# Patient Record
Sex: MALE | Race: WHITE | HISPANIC OR LATINO
[De-identification: names, ages, dates, MRNs, and addresses within clinical notes are randomized per-mention and may not be internally consistent; named-entity substitution may affect disease eponyms.]

---

## 2017-07-08 ENCOUNTER — HOSPITAL (OUTPATIENT)
Dept: OTHER | Age: 37
End: 2017-07-08
Attending: EMERGENCY MEDICINE

## 2019-01-13 ENCOUNTER — HOSPITAL ENCOUNTER (EMERGENCY)
Facility: HOSPITAL | Age: 39
Discharge: HOME | End: 2019-01-13
Attending: EMERGENCY MEDICINE

## 2019-01-13 VITALS
OXYGEN SATURATION: 96 % | BODY MASS INDEX: 28.35 KG/M2 | WEIGHT: 160 LBS | HEART RATE: 85 BPM | RESPIRATION RATE: 18 BRPM | SYSTOLIC BLOOD PRESSURE: 153 MMHG | DIASTOLIC BLOOD PRESSURE: 83 MMHG | HEIGHT: 63 IN | TEMPERATURE: 98 F

## 2019-01-13 DIAGNOSIS — J02.0 STREP PHARYNGITIS: Primary | ICD-10-CM

## 2019-01-13 LAB — S PYO AG THROAT QL: POSITIVE

## 2019-01-13 PROCEDURE — 99283 EMERGENCY DEPT VISIT LOW MDM: CPT

## 2019-01-13 PROCEDURE — 87430 STREP A AG IA: CPT

## 2019-01-13 RX ORDER — PENICILLIN V POTASSIUM 500 MG/1
500 TABLET ORAL 2 TIMES DAILY
Qty: 20 TABLET | Refills: 0 | Status: SHIPPED | OUTPATIENT
Start: 2019-01-13 | End: 2019-01-23

## 2019-01-13 NOTE — ED PROVIDER NOTES
Patient Seen in: [facility] AND [facility] Emergency Department    History   Patient presents with:  Sore Throat    Stated Complaint: sore throat    HPI    Patient is a 39-year-old male who presents with sore throat times 5 days.   Positive pain with swallowing b components within normal limits       MDM   Patient notified of strep results. Will start on antibiotics. Tolerating p.o. and appears nontoxic.             Disposition and Plan     Clinical Impression:  Strep pharyngitis  (primary encounter diagnosis)

## 2024-10-04 ENCOUNTER — OFFICE VISIT (OUTPATIENT)
Dept: FAMILY MEDICINE CLINIC | Facility: CLINIC | Age: 44
End: 2024-10-04

## 2024-10-04 ENCOUNTER — NURSE TRIAGE (OUTPATIENT)
Dept: FAMILY MEDICINE CLINIC | Facility: CLINIC | Age: 44
End: 2024-10-04

## 2024-10-04 VITALS
BODY MASS INDEX: 27.32 KG/M2 | OXYGEN SATURATION: 97 % | HEART RATE: 70 BPM | HEIGHT: 65 IN | WEIGHT: 164 LBS | DIASTOLIC BLOOD PRESSURE: 73 MMHG | SYSTOLIC BLOOD PRESSURE: 127 MMHG | TEMPERATURE: 98 F

## 2024-10-04 DIAGNOSIS — H66.91 RIGHT OTITIS MEDIA, UNSPECIFIED OTITIS MEDIA TYPE: ICD-10-CM

## 2024-10-04 DIAGNOSIS — J18.9 ATYPICAL PNEUMONIA: Primary | ICD-10-CM

## 2024-10-04 PROCEDURE — 99203 OFFICE O/P NEW LOW 30 MIN: CPT | Performed by: STUDENT IN AN ORGANIZED HEALTH CARE EDUCATION/TRAINING PROGRAM

## 2024-10-04 RX ORDER — FLUTICASONE PROPIONATE 50 MCG
1 SPRAY, SUSPENSION (ML) NASAL DAILY
Qty: 1 EACH | Refills: 3 | Status: SHIPPED | OUTPATIENT
Start: 2024-10-04

## 2024-10-04 RX ORDER — AZITHROMYCIN 250 MG/1
TABLET, FILM COATED ORAL
Qty: 6 TABLET | Refills: 0 | Status: SHIPPED | OUTPATIENT
Start: 2024-10-04 | End: 2024-10-08

## 2024-10-04 NOTE — TELEPHONE ENCOUNTER
Action Requested: Summary for Provider     []  Critical Lab, Recommendations Needed  [] Need Additional Advice  []   FYI    []   Need Orders  [] Need Medications Sent to Pharmacy  []  Other     SUMMARY: Office visit  Future Appointments   Date Time Provider Department Center   10/4/2024 10:45 AM Nilda Billy MD [facility]     Reason for call: Weakness  Onset: Data Unavailable    Patient states he has weakness and nausea that has been going on for one week. He denies fever, vomiting, diarrhea.     Reason for Disposition   MODERATE weakness (i.e., interferes with work, school, normal activities) and persists > 3 days    Protocols used: Weakness (Generalized) and Fatigue-A-OH

## 2024-10-04 NOTE — PROGRESS NOTES
HPI:    Patient ID: Leopoldo Solorio is a 44 year old male.    HPI  Pt presenting with fatigue. Wife present for visit.    Onset of Right ear discomfort with water sensation last week  Associated throat irritation, improved  Complains of increased fatigue, intermittent cough  Endorses nausea without vomiting  Dizziness with movement, upon waking  Hydrating as able  No chest pain, chest tightness, diarrhea/constipation  Reports change in stool odor  No recent travel  No new foods, exposure, medications  No known sick contacts      Review of Systems   A comprehensive 10 point review of systems was completed.  Pertinent positives and negatives noted in the the HPI.       Current Outpatient Medications   Medication Sig Dispense Refill    azithromycin 250 MG Oral Tab Take 2 tablets (500 mg total) by mouth daily for 1 day, THEN 1 tablet (250 mg total) daily for 4 days. 6 tablet 0    fluticasone propionate 50 MCG/ACT Nasal Suspension 1 spray by Nasal route daily. One spray per each nostril daily. 1 each 3     Allergies:  Allergies   Allergen Reactions    Ibuprofen ANAPHYLAXIS    Shellfish-Derived Products ANAPHYLAXIS      Vitals:    10/04/24 1037   BP: 127/73   Pulse: 70   Temp: 97.8 °F (36.6 °C)   TempSrc: Oral   SpO2: 97%   Weight: 164 lb (74.4 kg)   Height: 5' 5\" (1.651 m)       Body mass index is 27.29 kg/m².   PHYSICAL EXAM:   Physical Exam  Vitals reviewed.   Constitutional:       General: He is not in acute distress.  HENT:      Head: Normocephalic.      Right Ear: A middle ear effusion is present. Tympanic membrane is erythematous and bulging.      Left Ear: A middle ear effusion is present.   Eyes:      Conjunctiva/sclera: Conjunctivae normal.   Cardiovascular:      Rate and Rhythm: Normal rate and regular rhythm.      Pulses: Normal pulses.      Heart sounds: Normal heart sounds.   Pulmonary:      Effort: Pulmonary effort is normal.      Breath sounds: Examination of the right-middle field reveals decreased  breath sounds. Decreased breath sounds present.      Comments: +egophany  Abdominal:      General: Bowel sounds are normal.      Palpations: Abdomen is soft.   Musculoskeletal:      Cervical back: Normal range of motion. No tenderness.      Right lower leg: No edema.      Left lower leg: No edema.   Lymphadenopathy:      Cervical: No cervical adenopathy.   Neurological:      General: No focal deficit present.      Mental Status: He is alert and oriented to person, place, and time. Mental status is at baseline.   Psychiatric:         Mood and Affect: Mood normal.         Behavior: Behavior normal.             ASSESSMENT/PLAN:   1. Atypical pneumonia  - continue supportive care including nasal saline/suction, humidifier use  - increase hydration and rest as tolerated  - will start azithromycin  - discussed red flags for urgent evaluation  - to call with any questions/concerns  - azithromycin 250 MG Oral Tab; Take 2 tablets (500 mg total) by mouth daily for 1 day, THEN 1 tablet (250 mg total) daily for 4 days.  Dispense: 6 tablet; Refill: 0    2. Right otitis media, unspecified otitis media type  - azithromycin as above.  - demonstrated how to administer Flonase medication  - avoid triggers as able  - increase fluid hydration and rest as tolerated  - to call with any questions or concerns  - azithromycin 250 MG Oral Tab; Take 2 tablets (500 mg total) by mouth daily for 1 day, THEN 1 tablet (250 mg total) daily for 4 days.  Dispense: 6 tablet; Refill: 0  - fluticasone propionate 50 MCG/ACT Nasal Suspension; 1 spray by Nasal route daily. One spray per each nostril daily.  Dispense: 1 each; Refill: 3    Pt verbalized understanding and agrees with plan.    No orders of the defined types were placed in this encounter.      Meds This Visit:  Requested Prescriptions     Signed Prescriptions Disp Refills    azithromycin 250 MG Oral Tab 6 tablet 0     Sig: Take 2 tablets (500 mg total) by mouth daily for 1 day, THEN 1 tablet  (250 mg total) daily for 4 days.    fluticasone propionate 50 MCG/ACT Nasal Suspension 1 each 3     Si spray by Nasal route daily. One spray per each nostril daily.       Imaging & Referrals:  None         ID#[id number]

## 2025-03-31 ENCOUNTER — NURSE TRIAGE (OUTPATIENT)
Dept: FAMILY MEDICINE CLINIC | Facility: CLINIC | Age: 45
End: 2025-03-31

## 2025-03-31 NOTE — TELEPHONE ENCOUNTER
With Mongolian interpretor ID #[id number] Patient sposhazia Meeks stated that today patient woke up today with right ear pain 6/10 that comes and goes and also having right jaw  pain. There is no redness of swelling in theses areas. Per spouse patient also feels a burning sensation all over his face and he has chills and body aches. Per wife patient does not have chest pain,shortness of breath,blurry vision, fever and no weakness of extremities. Inform wife patient needs to be seen today. Wife was advised to take patient to the immediate care. Per spouse the closest one is [place]. I provide her with the address to this location and she will take him now for a evaluation.   Reason for Disposition   Patient sounds very sick or weak to the triager    Protocols used: Earache-A-OH

## 2025-04-01 ENCOUNTER — HOSPITAL ENCOUNTER (EMERGENCY)
Facility: HOSPITAL | Age: 45
Discharge: HOME | End: 2025-04-01
Attending: EMERGENCY MEDICINE
Payer: COMMERCIAL

## 2025-04-01 VITALS
OXYGEN SATURATION: 96 % | SYSTOLIC BLOOD PRESSURE: 138 MMHG | HEIGHT: 65 IN | WEIGHT: 160 LBS | RESPIRATION RATE: 17 BRPM | TEMPERATURE: 99 F | HEART RATE: 71 BPM | DIASTOLIC BLOOD PRESSURE: 95 MMHG | BODY MASS INDEX: 26.66 KG/M2

## 2025-04-01 DIAGNOSIS — J32.9 VIRAL SINUSITIS: Primary | ICD-10-CM

## 2025-04-01 DIAGNOSIS — B97.89 VIRAL SINUSITIS: Primary | ICD-10-CM

## 2025-04-01 DIAGNOSIS — R09.81 NASAL CONGESTION: ICD-10-CM

## 2025-04-01 DIAGNOSIS — R03.0 ELEVATED BLOOD PRESSURE READING: ICD-10-CM

## 2025-04-01 LAB
FLUAV + FLUBV RNA SPEC NAA+PROBE: NEGATIVE
FLUAV + FLUBV RNA SPEC NAA+PROBE: NEGATIVE
RSV RNA SPEC NAA+PROBE: NEGATIVE
SARS-COV-2 RNA RESP QL NAA+PROBE: NOT DETECTED

## 2025-04-01 PROCEDURE — 0241U SARS-COV-2/FLU A AND B/RSV BY PCR (GENEXPERT): CPT

## 2025-04-01 PROCEDURE — 99283 EMERGENCY DEPT VISIT LOW MDM: CPT

## 2025-04-01 PROCEDURE — 0241U SARS-COV-2/FLU A AND B/RSV BY PCR (GENEXPERT): CPT | Performed by: EMERGENCY MEDICINE

## 2025-04-01 RX ORDER — ACETAMINOPHEN 500 MG
1000 TABLET ORAL ONCE
Status: COMPLETED | OUTPATIENT
Start: 2025-04-01 | End: 2025-04-01

## 2025-04-01 RX ORDER — FLUTICASONE PROPIONATE 50 MCG
2 SPRAY, SUSPENSION (ML) NASAL DAILY PRN
Qty: 9.9 ML | Refills: 0 | Status: SHIPPED | OUTPATIENT
Start: 2025-04-01 | End: 2025-05-01

## 2025-04-01 RX ORDER — OXYMETAZOLINE HYDROCHLORIDE 0.05 G/100ML
1 SPRAY NASAL 2 TIMES DAILY
Qty: 15 ML | Refills: 0 | Status: SHIPPED | OUTPATIENT
Start: 2025-04-01 | End: 2025-04-04

## 2025-04-01 RX ORDER — CETIRIZINE HYDROCHLORIDE 10 MG/1
10 TABLET ORAL DAILY PRN
Qty: 30 TABLET | Refills: 0 | Status: SHIPPED | OUTPATIENT
Start: 2025-04-01 | End: 2025-05-01

## 2025-04-01 RX ORDER — ACETAMINOPHEN 325 MG/1
650 TABLET ORAL EVERY 6 HOURS PRN
Qty: 30 TABLET | Refills: 0 | Status: SHIPPED | OUTPATIENT
Start: 2025-04-01

## 2025-04-01 NOTE — DISCHARGE INSTRUCTIONS
Thank you for seeking care at [facility] Emergency Department.  You have been seen and evaluated for viral symptoms. Your swab today was negative however your family member tested positive for influenza and it is likely you are having similar symptoms from this.    We discussed the results of your workup   Please read the instructions provided   If given prescriptions, take as instructed    Please treat fevers with Tylenol or Motrin and remember to keep yourself hydrated as this is the most important thing to help you feel better.      Remember, your care process does not end after your visit today. Please follow-up with your doctor within 1-2 days for a follow-up check to ensure you are  improving, to see if you need any further evaluation/testing, or to evaluate for any alternate diagnoses.     Your blood pressure was noted to be elevated in the ER today. Please follow up with your primary doctor within the next 2-3 months for a reevaluation. Uncontrolled high blood pressure can lead to strokes, heart attacks, and kidney failure.     Please return to the emergency department if you develop any new or worsening symptoms such as dehydration, persistent fevers, difficulty with breathing,  or if you develop any other new or concerning symptoms as these could be signs of more serious medical illness.    We hope you feel better.    Geeta por buscar atención en el Departamento de Emergencias de [facility].  Gregorio sido examinado y evaluado para detectar síntomas virales. Soni hisopado de hoy fue negativo, sin embargo, soni familiar karina positivo para la influenza y es probable que tenga síntomas similares a esto.  Discutimos los resultados de soni evaluación  Por favor, daniel las instrucciones proporcionadas  Si se le dan recetas, tómelas según las instrucciones    Trate la fiebre con Tylenol o Motrin y recuerde mantenerse hidratado, ya que esto es lo más importante para ayudarlo a sentirse mejor.    Recuerde que soni  proceso de atención no termina después de soni visita de fco. Realice un seguimiento con soni médico dentro de 1-2 días para un control de seguimiento para asegurarse de que está mejorando, para dee si necesita alguna evaluación / prueba adicional o para evaluar cualquier diagnóstico alternativo.    Se observó que soni presión arterial estaba elevada en la donavan de emergencias hoy. Consulte a soni médico de cabecera dentro de los próximos 2-3 meses para abdullahi reevaluación. La presión arterial fe no controlada puede provocar accidentes cerebrovasculares, ataques cardíacos e insuficiencia renal.    Regrese al departamento de emergencias si desarrolla algún síntoma nuevo o que empeora, gamal deshidratación, fiebre persistente, dificultad para respirar o si desarrolla cualquier otro síntoma nuevo o preocupante, ya que podrían ser signos de abdullahi enfermedad médica más grave. Esperamos que te sientas mejor.

## 2025-04-01 NOTE — ED PROVIDER NOTES
Patient Seen in: [facility] Emergency Department    History     Chief Complaint   Patient presents with    Cough/URI     Stated Complaint: Whole Body Ache, Fatigue    HPI    Patient is an otherwise healthy 44M here with complaints of maxillary and frontal sinus pressure, nasal congestion, body aches, for 2-3 days. Reports associated ear pressure on both sids. No fever but has had chills. Son sick with similar sx at home. No CP or SOB. No vision changes. No abd pain, nausea or vomiting. No cough. Has had a couple of episodes of nonbloody diarrhea. No travel.  No rashes. No recent fall or head trauma.       History reviewed. No pertinent past medical history.    History reviewed. No pertinent surgical history.         History reviewed. No pertinent family history.    Social History     Socioeconomic History    Marital status:    Tobacco Use    Smoking status: Every Day     Current packs/day: 0.50     Average packs/day: 0.5 packs/day for 25.0 years (12.5 ttl pk-yrs)     Types: Cigarettes    Smokeless tobacco: Never   Vaping Use    Vaping status: Never Used   Substance and Sexual Activity    Alcohol use: Yes     Alcohol/week: 7.0 standard drinks of alcohol     Types: 7 Standard drinks or equivalent per week    Drug use: Not Currently   Social History Narrative    ** Merged History Encounter **            Review of Systems    Positive for stated complaint: Whole Body Ache, Fatigue  Other systems are as noted in HPI.  Constitutional and vital signs reviewed.      All other systems reviewed and negative except as noted above.    PSFH elements reviewed from today and agreed except as otherwise stated in HPI.    Physical Exam     ED Triage Vitals   BP 04/01/25 0929 (!) 150/92   Pulse 04/01/25 0929 81   Resp 04/01/25 0929 18   Temp 04/01/25 0929 98.9 °F (37.2 °C)   Temp src 04/01/25 0929 Temporal   SpO2 04/01/25 0929 96 %   O2 Device 04/01/25 1046 None (Room air)       Current:BP (!) 138/95   Pulse 71   Temp  98.9 °F (37.2 °C) (Temporal)   Resp 17   Ht 165.1 cm (5' 5\")   Wt 72.6 kg   SpO2 96%   BMI 26.63 kg/m²     Physical Exam  Vitals and nursing note reviewed.   Constitutional:       General: He is not in acute distress.     Appearance: He is not ill-appearing.   HENT:      Head: Normocephalic and atraumatic.      Right Ear: Tympanic membrane, ear canal and external ear normal.      Left Ear: Tympanic membrane, ear canal and external ear normal.      Nose: Congestion present. No rhinorrhea.      Comments: Mild ttp to maxillary sinuses      Mouth/Throat:      Mouth: Mucous membranes are moist.      Pharynx: Oropharynx is clear. No oropharyngeal exudate or posterior oropharyngeal erythema.   Eyes:      Extraocular Movements: Extraocular movements intact.      Conjunctiva/sclera: Conjunctivae normal.      Pupils: Pupils are equal, round, and reactive to light.   Cardiovascular:      Rate and Rhythm: Normal rate and regular rhythm.      Heart sounds: No murmur heard.  Pulmonary:      Effort: Pulmonary effort is normal. No respiratory distress.      Breath sounds: No wheezing or rales.   Abdominal:      General: There is no distension.      Palpations: Abdomen is soft.      Tenderness: There is no abdominal tenderness. There is no guarding or rebound.   Musculoskeletal:         General: No deformity.      Cervical back: Neck supple. No rigidity.   Lymphadenopathy:      Cervical: No cervical adenopathy.   Skin:     General: Skin is warm and dry.      Capillary Refill: Capillary refill takes less than 2 seconds.      Findings: No rash.   Neurological:      General: No focal deficit present.      Mental Status: He is alert and oriented to person, place, and time.      Cranial Nerves: No cranial nerve deficit.             ED Course     Labs Reviewed   SARS-COV-2/FLU A AND B/RSV BY PCR (GENEXPERT) - Normal    Narrative:     This test is intended for the qualitative detection and differentiation of SARS-CoV-2, influenza A,  influenza B, and respiratory syncytial virus (RSV) viral RNA in nasopharyngeal or nares swabs from individuals suspected of respiratory viral infection consistent with COVID-19 by their healthcare provider. Signs and symptoms of respiratory viral infection due to SARS-CoV-2, influenza, and RSV can be similar.    Test performed using the Xpert Xpress SARS-CoV-2/FLU/RSV (real time RT-PCR)  assay on the GeneXpert instrument, [employer], [place] [address].   This test is being used under the Food and Drug Administration's Emergency Use Authorization.    The authorized Fact Sheet for Healthcare Providers for this assay is available upon request from the laboratory.       MDM     This patient presents with nasal congestion, sinus pressure, chills, body aches ongoing x2-3 days. Son with similar sx     Differential diagnoses considered includes, but is not limited to:   Viral syndrome  Viral sinusitis  Low suspicion for pneumonia given lack of cough, fever or SOB     Will obtain the following tests: viral cov flu rsv swab   Will administer the following medications/therapies: tylenol. Pt allergic to ibuprofen     Please see ED course for my independent review of these tests/imaging results.    Chronic conditions affecting care: n/a     Workup and medications considered but not ordered: cxr, cbc, bmp    Social Determinants of Health that impacted care: n/a     ED Course as of 04/01/25 1759  ------------------------------------------------------------  Time: 04/01 1023  Comment: Cov flu rsv neg patient updated with results, his son swab did come back positive for influenza I suspect patient likely has had or recently had influenza or other viral syndrome.  Counseled on supportive cares including Afrin for 3 days but no longer to avoid dependence, Flonase, Zyrtec, Tylenol and close follow-up with primary doctor.  Return if chest pain, shortness of breath, inability to eat or drink or other concerns.  Counseled him to get blood  pressure rechecked in the outpatient basis as it was elevated today.  He verbalized understanding comfortable with the plan for discharge.           Disposition and Plan     Clinical Impression:  1. Viral sinusitis    2. Nasal congestion    3. Elevated blood pressure reading        Disposition:  Discharge    Follow-up:  Nicolás Anna MD  [address]  [facility] [address] [phone]    Schedule an appointment as soon as possible for a visit in 2 day(s)      [facility] Emergency Department  [address]  [facility] [id number] [phone]  Go to  If symptoms worsen, right away      Medications Prescribed:  Discharge Medication List as of 4/1/2025 10:40 AM        START taking these medications    Details   !! fluticasone propionate 50 MCG/ACT Nasal Suspension 2 sprays by Nasal route daily as needed for Rhinitis or Allergies., Normal, Disp-9.9 mL, R-0      cetirizine 10 MG Oral Tab Take 1 tablet (10 mg total) by mouth daily as needed., Normal, Disp-30 tablet, R-0      oxymetazoline 0.05 % Nasal Solution 1 spray by Nasal route 2 (two) times daily for 3 days. Do not take for more than 3 days to avoid getting dependent on this medicine. It can cause rebound congestion if used too often., Normal, Disp-15 mL, R-0      acetaminophen (TYLENOL) 325 MG Oral Tab Take 2 tablets (650 mg total) by mouth every 6 (six) hours as needed., Normal, Disp-30 tablet, R-0       !! - Potential duplicate medications found. Please discuss with provider.        Nelsy Sterling, DO  Attending Physician  Emergency Medicine

## 2025-07-02 ENCOUNTER — TELEPHONE (OUTPATIENT)
Dept: FAMILY MEDICINE CLINIC | Facility: CLINIC | Age: 45
End: 2025-07-02

## 2025-07-02 NOTE — TELEPHONE ENCOUNTER
With  Magaly ID # [id number]    Patient's spouse Jeanna (no JEREMIE) states patient had viral pneumonia in April . Patient is still feeling fatigued. Patient also has a feeling a fullness in his abdomen. Spouse denies any difficulty breathing or emergency symptoms. Patient is currently not home with the spouse. Advised patient's spouse that if any worsening symptoms to call back. Any chest pain or difficulty breathing to go to ER. Spouse verbalized understanding.     Future Appointments   Date Time Provider Department Center   7/10/2025  3:10 PM Malorie Thompson APRN [facility]

## 2025-07-10 ENCOUNTER — OFFICE VISIT (OUTPATIENT)
Dept: FAMILY MEDICINE CLINIC | Facility: CLINIC | Age: 45
End: 2025-07-10
Payer: COMMERCIAL

## 2025-07-10 VITALS
HEART RATE: 73 BPM | HEIGHT: 64 IN | WEIGHT: 177 LBS | BODY MASS INDEX: 30.22 KG/M2 | OXYGEN SATURATION: 95 % | SYSTOLIC BLOOD PRESSURE: 135 MMHG | DIASTOLIC BLOOD PRESSURE: 85 MMHG

## 2025-07-10 DIAGNOSIS — K21.00 GASTROESOPHAGEAL REFLUX DISEASE WITH ESOPHAGITIS WITHOUT HEMORRHAGE: Primary | ICD-10-CM

## 2025-07-10 DIAGNOSIS — M72.2 PLANTAR FASCIITIS OF LEFT FOOT: ICD-10-CM

## 2025-07-10 RX ORDER — OMEPRAZOLE 40 MG/1
40 CAPSULE, DELAYED RELEASE ORAL DAILY
Qty: 90 CAPSULE | Refills: 0 | Status: SHIPPED | OUTPATIENT
Start: 2025-07-10 | End: 2025-07-14

## 2025-07-10 RX ORDER — ERGOCALCIFEROL (VITAMIN D2) 10 MCG
TABLET ORAL
COMMUNITY
End: 2025-07-10 | Stop reason: ALTCHOICE

## 2025-07-10 RX ORDER — CYCLOBENZAPRINE HCL 5 MG
5 TABLET ORAL NIGHTLY
Qty: 10 TABLET | Refills: 0 | Status: SHIPPED | OUTPATIENT
Start: 2025-07-10

## 2025-07-10 RX ORDER — CETIRIZINE HYDROCHLORIDE 5 MG/1
5 TABLET ORAL DAILY
COMMUNITY

## 2025-07-10 RX ORDER — UBIDECARENONE 75 MG
250 CAPSULE ORAL DAILY
COMMUNITY

## 2025-07-10 NOTE — PROGRESS NOTES
Subjective:   Leopoldo Solorio Lopez is a 45 year old male who presents for Dizziness (Pt states he had dizziness and SOB when he was eating happened 2 times over the last 3 months  but always been feeling boated and throat blockage. ) and Ankle Pain (Pt states he has been having severe pain on left ankle pain in the morning time and when he walks on it pain lessens. ).     HPI   Patient complains of dyspepsia, epigastric pain, and nausea. Symptoms have been present for approximately 3 months. Symptoms include abdominal bloating, belching, choking on food, cough, deep pressure at base of neck, difficulty swallowing, dysphagia, fullness after meals, heartburn, midespigastric pain, nausea, need to clear throat frequently, odynophagia, regurgitation of undigested food, shortness of breath, symptoms primarily relate to meals, and lying down after meals, upper abdominal discomfort, and wheezing. The patient denies belching and eructation, bilious reflux, chest pain, hematemesis, hoarseness, laryngitis, melena, nocturnal burning, unexpected weight loss, and waterbrash. Symptoms appear to be worsened by activity, alcohol, caffeine, carbonated beverages, chocolate, citrus juices, fatty foods, large meals, lying down, lying down after eating, peppermint, and tomato sauce. Risk factors present for GERD include tobacco abuse, alcohol use, caffeine use, and obesity. Risk factors absent for GERD are NSAID use, ASA use, oral corticosteroid use, theophylline use, tight fitting clothing, previous diagnosis of hiatal hernia, previous diagnosis of peptic ulcer disease, and previous diagnosis of Rome's esophagus. Studies performed so far include none. Treatments tried so far include none. Results of treatment: not applicable. Currently, the symptoms are severe and occur approximately 3 times per day.     Patient having pain in the heel of his left foot. This pain has been going on for the past 6 months. There has been no history of  trauma. The pain occurs with the first step in the morning and at the end of the day after standing for prolonged period of time at work. The problem has been gradually worsening. The quality of pain is described as sharp. The pain is severity of 8/10. Associated symptoms include an inability to bear weight, a limited range of motion and stiffness. The symptoms are aggravated by standing and walking. Patient states that he tired taking motrin for the pain which provided mild relief.     History/Other:      Chief Complaint Reviewed and Verified  Nursing Notes Reviewed and   Verified  Tobacco Reviewed  Allergies Reviewed  Medications Reviewed    Problem List Reviewed  Medical History Reviewed  Surgical History   Reviewed  Family History Reviewed  Social History Reviewed           Tobacco:  Tobacco Use[1]  E-Cigarettes/Vaping       Questions Responses    E-Cigarette Use Never User           Tobacco cessation counseling for <3 minutes.        Current Medications[2]    Allergies[3]    Review of Systems   Constitutional: Negative.  Negative for activity change and fatigue.   HENT: Negative.  Negative for congestion, ear pain, rhinorrhea and sneezing.    Eyes: Negative.  Negative for redness.   Respiratory:  Positive for cough, shortness of breath and wheezing.    Cardiovascular: Negative.  Negative for chest pain.   Gastrointestinal:  Positive for abdominal distention, abdominal pain and nausea. Negative for constipation, diarrhea and vomiting.   Endocrine: Negative.    Genitourinary: Negative.  Negative for difficulty urinating and frequency.   Musculoskeletal:  Positive for myalgias. Negative for back pain and joint swelling.   Skin: Negative.  Negative for rash.   Allergic/Immunologic: Negative.    Neurological:  Positive for dizziness. Negative for syncope, light-headedness and headaches.   Hematological: Negative.    Psychiatric/Behavioral: Negative.           Objective:   /85 (BP Location: Right arm,  Patient Position: Sitting, Cuff Size: adult)   Pulse 73   Ht 5' 4\" (1.626 m)   Wt 177 lb (80.3 kg)   SpO2 95%   BMI 30.38 kg/m²  Estimated body mass index is 30.38 kg/m² as calculated from the following:    Height as of this encounter: 5' 4\" (1.626 m).    Weight as of this encounter: 177 lb (80.3 kg).      Physical Exam  Vitals and nursing note reviewed.   Constitutional:       Appearance: Normal appearance. He is normal weight.   HENT:      Head: Normocephalic and atraumatic.      Right Ear: Tympanic membrane normal.      Left Ear: Tympanic membrane normal.      Nose: Nose normal.      Mouth/Throat:      Mouth: Mucous membranes are moist.      Pharynx: Oropharynx is clear.   Eyes:      Extraocular Movements: Extraocular movements intact.      Conjunctiva/sclera: Conjunctivae normal.      Pupils: Pupils are equal, round, and reactive to light.   Cardiovascular:      Rate and Rhythm: Normal rate and regular rhythm.      Pulses: Normal pulses.      Heart sounds: Normal heart sounds.   Pulmonary:      Effort: Pulmonary effort is normal.      Breath sounds: Normal breath sounds.   Abdominal:      General: Abdomen is flat. Bowel sounds are normal.      Palpations: Abdomen is soft.      Tenderness: There is abdominal tenderness in the epigastric area.   Musculoskeletal:      Cervical back: Normal range of motion and neck supple.      Left foot: Tenderness present.        Feet:       Comments: Pain    Skin:     General: Skin is warm and dry.   Neurological:      General: No focal deficit present.      Mental Status: He is alert and oriented to person, place, and time. Mental status is at baseline.   Psychiatric:         Mood and Affect: Mood normal.         Behavior: Behavior normal.         Thought Content: Thought content normal.         Judgment: Judgment normal.           Assessment & Plan:     Assessment & Plan  Gastroesophageal reflux disease with esophagitis without hemorrhage  -Advised to take medication as  directed  -Patient recommended to avoid spicy, fried foods, foods made with citric fruit such as damon, tomatoes, and oranges  -Patient to avoid NSAID (naproxen/Aleve, ibuprofen/Motrin/Advil)  -Should minimize the use of Caffeine (in coffee, tea, chocolate, sodas, and energy drinks)  -Patient should cut out tobacco use and alcohol use  -Encouraged to eat small portions and not to eat late night  -Having head and upper body raised when laying down helps limit reflux   -Follow up in 1-2 months if not improved     Orders:    Omeprazole 40 MG Oral Capsule Delayed Release; Take 1 capsule (40 mg total) by mouth daily.    Plantar fasciitis of left foot  -Advised patient about no impact sports, no jogging/stairs/running  -Encouraged to Rest/Ice, Compression and elevate food  -Patient educated about using tennis ball/frozen water bottle for messages   -Need to use proper footwear, and possible orthotics  -If no improvement will consider PT     Orders:    cyclobenzaprine 5 MG Oral Tab; Take 1 tablet (5 mg total) by mouth nightly. As needed    Physical Therapy Referral - [facility]       Medication use, effects and side effects discussed in detail with patient. The patient indicated understanding of the diagnosis and agreed with the plan of care.    Return in about 4 weeks (around 8/7/2025) for Medication Check, Annual Physical.    ROCIO Garcia         [1]   Social History  Tobacco Use   Smoking Status Every Day    Current packs/day: 0.50    Average packs/day: 0.5 packs/day for 25.0 years (12.5 ttl pk-yrs)    Types: Cigarettes   Smokeless Tobacco Never   [2]   Current Outpatient Medications   Medication Sig Dispense Refill    cetirizine 5 MG Oral Tab Take 1 tablet (5 mg total) by mouth daily.      Ergocalciferol (VITAMIN D2) 10 MCG (400 UNIT) Oral Tab Take by mouth.      cyanocobalamin 100 MCG Oral Tab Take 2.5 tablets (250 mcg total) by mouth daily.      cyclobenzaprine 5 MG Oral Tab Take 1 tablet (5 mg  total) by mouth nightly. As needed 10 tablet 0    Omeprazole 40 MG Oral Capsule Delayed Release Take 1 capsule (40 mg total) by mouth daily. 90 capsule 0    acetaminophen (TYLENOL) 325 MG Oral Tab Take 2 tablets (650 mg total) by mouth every 6 (six) hours as needed. (Patient not taking: Reported on 7/10/2025) 30 tablet 0    fluticasone propionate 50 MCG/ACT Nasal Suspension 1 spray by Nasal route daily. One spray per each nostril daily. (Patient not taking: Reported on 7/10/2025) 1 each 3   [3]   Allergies  Allergen Reactions    Ibuprofen HIVES    Ibuprofen ANAPHYLAXIS    Peanuts ANAPHYLAXIS    Shellfish-Derived Products ANAPHYLAXIS

## 2025-07-14 ENCOUNTER — TELEPHONE (OUTPATIENT)
Dept: FAMILY MEDICINE CLINIC | Facility: CLINIC | Age: 45
End: 2025-07-14

## 2025-07-14 DIAGNOSIS — K21.00 GASTROESOPHAGEAL REFLUX DISEASE WITH ESOPHAGITIS WITHOUT HEMORRHAGE: ICD-10-CM

## 2025-07-14 RX ORDER — OMEPRAZOLE 40 MG/1
40 CAPSULE, DELAYED RELEASE ORAL DAILY
Qty: 90 CAPSULE | Refills: 0 | Status: SHIPPED | OUTPATIENT
Start: 2025-07-14 | End: 2025-10-12

## 2025-07-14 NOTE — TELEPHONE ENCOUNTER
Wife/Jeanna (Last signed Verbal Release verified) called in [employer] stating Rx is not covered under the patient's plan, [facility] called patient and made aware the Rx is not covered under his plan. I made her aware of [employer] coupon that would provide significant savings for Rx--> she elected Rx to go to [facility] with [facility]--> sent: Receipt confirmed by pharmacy (7/14/2025 10:32 AM CDT). She verbalized understanding. No further questions or concerns at this time.

## 2025-08-12 ENCOUNTER — OFFICE VISIT (OUTPATIENT)
Dept: FAMILY MEDICINE CLINIC | Facility: CLINIC | Age: 45
End: 2025-08-12

## 2025-08-12 VITALS
HEIGHT: 65 IN | SYSTOLIC BLOOD PRESSURE: 136 MMHG | DIASTOLIC BLOOD PRESSURE: 84 MMHG | BODY MASS INDEX: 28.66 KG/M2 | WEIGHT: 172 LBS | HEART RATE: 78 BPM | OXYGEN SATURATION: 96 %

## 2025-08-12 DIAGNOSIS — F17.200 SMOKER: ICD-10-CM

## 2025-08-12 DIAGNOSIS — K21.00 GASTROESOPHAGEAL REFLUX DISEASE WITH ESOPHAGITIS WITHOUT HEMORRHAGE: ICD-10-CM

## 2025-08-12 DIAGNOSIS — Z00.01 ENCOUNTER FOR GENERAL ADULT MEDICAL EXAMINATION WITH ABNORMAL FINDINGS: Primary | ICD-10-CM

## 2025-08-12 DIAGNOSIS — Z12.11 SCREENING FOR MALIGNANT NEOPLASM OF COLON: ICD-10-CM

## 2025-08-12 DIAGNOSIS — E55.9 VITAMIN D DEFICIENCY: ICD-10-CM

## 2025-08-12 PROCEDURE — 99396 PREV VISIT EST AGE 40-64: CPT

## 2025-08-22 ENCOUNTER — NURSE ONLY (OUTPATIENT)
Facility: CLINIC | Age: 45
End: 2025-08-22

## 2025-08-22 DIAGNOSIS — Z12.11 COLON CANCER SCREENING: Primary | ICD-10-CM

## (undated) NOTE — ED AVS SNAPSHOT
Leopoldo Simpson Solon   MRN: [id number]    Department:  [facility] Emergency Department   Date of Visit:  1/13/2019           Disclosure     Insurance plans vary and the physician(s) referred by the ER may not be covered by your plan.  Please c CARE PHYSICIAN AT ONCE OR RETURN IMMEDIATELY TO THE EMERGENCY DEPARTMENT. If you have been prescribed any medication(s), please fill your prescription right away and begin taking the medication(s) as directed.   If you believe that any of the medications

## (undated) NOTE — LETTER
10/4/2024              Leopoldo Solorio        [address]  [place],         [facility] [address]         To Whom It May Concern:    Leopoldo Solorio was seen and evaluated in our office on 10/4/2024.  Due to his symptoms, please excuse him from work on 10/4/2024.  Pending clinical improvement, his anticipated return to work is 10/7/2024.    If you require any more information, please contact our office.      Sincerely,      Nilda Billy MD          Document electronically generated by:  Nilda Billy MD